# Patient Record
Sex: MALE | Race: BLACK OR AFRICAN AMERICAN | NOT HISPANIC OR LATINO | Employment: UNEMPLOYED | URBAN - METROPOLITAN AREA
[De-identification: names, ages, dates, MRNs, and addresses within clinical notes are randomized per-mention and may not be internally consistent; named-entity substitution may affect disease eponyms.]

---
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
## 2025-04-09 ENCOUNTER — TELEPHONE (OUTPATIENT)
Dept: DERMATOLOGY | Facility: CLINIC | Age: 20
End: 2025-04-09
Payer: COMMERCIAL

## 2025-04-09 NOTE — TELEPHONE ENCOUNTER
Patient Details  Patient's Full Name  Xander Mccray  Patient's Date of Birth  2005  Patient's Phone Number  [phone]  Patient's Email ID  [email or web address]  Has the patient visited [facility] in the past 3 years?  Yes  Address Details  Address  [address] [place] [id number]  Appointment Preferences  Reason for appointment  Acne and also skin condition   Preferred Visit Type  In-person   Services   Do you need an  for your appointment?  No  Billing Preference  Which billing situation applies to the patient?  Patient is self-pay  Callback Preferences  Could you share your preferred callback time with us?  No, I don't have